# Patient Record
Sex: MALE | Race: WHITE | NOT HISPANIC OR LATINO | Employment: OTHER | URBAN - METROPOLITAN AREA
[De-identification: names, ages, dates, MRNs, and addresses within clinical notes are randomized per-mention and may not be internally consistent; named-entity substitution may affect disease eponyms.]

---

## 2021-09-21 NOTE — PROGRESS NOTES
Subjective   Patient ID: Lucho Carranza is a 71 y.o. male is being seen for consultation today at the request of SANFORD Nelson     He is being seen for Degenerative disc disease and spinal stenosis.    9/15/20 MRI C spine [facility]  11/16/20 XR C spine [facility]  12/3/20 CT C spine [facility]    Today patient reports neck pain that is located in the back and radiates on the left side down his arm rarely.    This very nice gentleman is here for my opinion.  He is already seen Dr. Fair.  Has had chronic neck pain for about 2 years.  It is mainly in the left side of the neck a bit worse with extension.  He has begun experiencing crunching sensation when he turns his neck to the left of the right and hears some grinding noises as well.  Once out in a while he gets a little left arm numbness but no significant pain and no weakness.  He has had 2 cervical ablations before.  Dr. Fair saw him and put him into physical therapy which helped very modestly and he has had 2 epidural blocks by Dr. Russo.  They've helped modestly.  He takes baclofen and Advil.  The pain flared up quite a bit and April of this year.  He was in [place] and had an ablation there which only helped very briefly.  He had 1 before which helped only briefly.  We talked about his cervical MRI which again provides some mechanical basis for his continued pain probably the facet disease which seems to be worse at C5-C6 on the left corresponding to the laterality of his symptoms.  I stressed that there really isn't anything from a surgical standpoint to do.  If on the other hand he develops more radiating arm pain right or left or both then that might change things but he would certainly need to be reimaged.  As far as what to do to his chronic neck pain again I stressed that it is mainly geared towards trying to reduce the severity of the symptoms.  Another ablation could be tried by his current pain doctor.  Dry needling was never tried in physical therapy  and and I thought it is reasonable to give it a try so we'll send him there and I suggested the use of the medicines that he is already on.  Obviously there is not a lot to do that hasn't been already done.  I'll refer him for dry needling as that might help a bit.  But will otherwise keep it open-ended.  If he has a episode of severe left arm pain or right arm pain about he is to let us know because he should be reevaluated probably reimaged.        Neck Pain   The pain is present in the midline and left side. The quality of the pain is described as aching. The pain is at a severity of 3/10. The symptoms are aggravated by twisting. The pain is worse during the day. Pertinent negatives include no fever, headaches, numbness, pain with swallowing or tingling. He has tried muscle relaxants, home exercises and ice (ablations. epidurals, physical therapy) for the symptoms. The treatment provided mild relief.       The following portions of the patient's history were reviewed and updated as appropriate: allergies, current medications, past family history, past medical history, past social history, past surgical history and problem list.    Review of Systems   Constitutional: Negative for chills and fever.   HENT: Negative for congestion.    Eyes: Negative for visual disturbance.   Respiratory: Negative for shortness of breath.    Cardiovascular: Negative for palpitations.   Gastrointestinal: Negative for nausea.   Endocrine: Negative for cold intolerance.   Genitourinary: Negative for urgency.   Musculoskeletal: Positive for neck pain.   Skin: Negative for rash.   Allergic/Immunologic: Negative for environmental allergies.   Neurological: Negative for tingling, numbness and headaches.   Hematological: Negative for adenopathy.   Psychiatric/Behavioral: Negative for sleep disturbance.   All other systems reviewed and are negative.          Objective     Vitals:    09/22/21 0918   BP: 110/68   Temp: 97.7 °F (36.5 °C)  "  Weight: 79.9 kg (176 lb 3.2 oz)   Height: 175.3 cm (69\")     Body mass index is 26.02 kg/m².      Physical Exam  Constitutional:       Appearance: He is well-developed.   HENT:      Head: Normocephalic and atraumatic.   Eyes:      Extraocular Movements: EOM normal.      Conjunctiva/sclera: Conjunctivae normal.      Pupils: Pupils are equal, round, and reactive to light.   Neck:      Vascular: No carotid bruit.   Neurological:      Mental Status: He is oriented to person, place, and time.      Coordination: Finger-Nose-Finger Test and Heel to Shin Test normal.      Gait: Gait is intact.      Deep Tendon Reflexes:      Reflex Scores:       Tricep reflexes are 2+ on the right side and 2+ on the left side.       Bicep reflexes are 2+ on the right side and 2+ on the left side.       Brachioradialis reflexes are 2+ on the right side and 2+ on the left side.       Patellar reflexes are 2+ on the right side and 2+ on the left side.       Achilles reflexes are 2+ on the right side and 2+ on the left side.  Psychiatric:         Speech: Speech normal.       Neurologic Exam     Mental Status   Oriented to person, place, and time.   Registration of memory: Good recent and remote memory.   Attention: normal. Concentration: normal.   Speech: speech is normal   Level of consciousness: alert  Knowledge: consistent with education.     Cranial Nerves     CN II   Visual fields full to confrontation.   Visual acuity: normal    CN III, IV, VI   Pupils are equal, round, and reactive to light.  Extraocular motions are normal.     CN V   Facial sensation intact.   Right corneal reflex: normal  Left corneal reflex: normal    CN VII   Facial expression full, symmetric.   Right facial weakness: none  Left facial weakness: none    CN VIII   Hearing: intact    CN IX, X   Palate: symmetric    CN XI   Right sternocleidomastoid strength: normal  Left sternocleidomastoid strength: normal    CN XII   Tongue: not atrophic  Tongue deviation: " none    Motor Exam   Muscle bulk: normal  Right arm tone: normal  Left arm tone: normal  Right leg tone: normal  Left leg tone: normal    Strength   Strength 5/5 except as noted.     Sensory Exam   Light touch normal.     Gait, Coordination, and Reflexes     Gait  Gait: normal    Coordination   Finger to nose coordination: normal  Heel to shin coordination: normal    Reflexes   Right brachioradialis: 2+  Left brachioradialis: 2+  Right biceps: 2+  Left biceps: 2+  Right triceps: 2+  Left triceps: 2+  Right patellar: 2+  Left patellar: 2+  Right achilles: 2+  Left achilles: 2+  Right : 2+  Left : 2+          Assessment/Plan   Independent Review of Radiographic Studies:      I personally reviewed the images from the following studies.    I reviewed the cervical MRI done on 9/15/2020 which did show multiple levels of disc degeneration and facet disease particular on the left at C5-C6.  A right C3-C4 lateral recess stenosis.  Agree with the report.        Medical Decision Making:      Again, nothing surgical.  We'll refer him to physical therapy to try some dry needling only.  He'll continue to work with Dr. Russo.  I will make it is unreasonable for another ablation to be done at that pain clinic.  There is nothing further that I would add right now so we can keep it open-ended.  If he develops arm symptoms on the right or the left or both he can certainly contact me and he would probably need to be reimaged and reevaluated.      Diagnoses and all orders for this visit:    1. Degeneration of intervertebral disc at C5-C6 level (Primary)  -     Ambulatory Referral to Physical Therapy Evaluate and treat    2. Chronic neck pain  -     Ambulatory Referral to Physical Therapy Evaluate and treat      Return if symptoms worsen or fail to improve.

## 2021-09-22 ENCOUNTER — OFFICE VISIT (OUTPATIENT)
Dept: NEUROSURGERY | Facility: CLINIC | Age: 71
End: 2021-09-22

## 2021-09-22 VITALS
SYSTOLIC BLOOD PRESSURE: 110 MMHG | DIASTOLIC BLOOD PRESSURE: 68 MMHG | BODY MASS INDEX: 26.1 KG/M2 | HEIGHT: 69 IN | TEMPERATURE: 97.7 F | WEIGHT: 176.2 LBS

## 2021-09-22 DIAGNOSIS — G89.29 CHRONIC NECK PAIN: ICD-10-CM

## 2021-09-22 DIAGNOSIS — M50.322 DEGENERATION OF INTERVERTEBRAL DISC AT C5-C6 LEVEL: Primary | ICD-10-CM

## 2021-09-22 DIAGNOSIS — M54.2 CHRONIC NECK PAIN: ICD-10-CM

## 2021-09-22 PROCEDURE — 99204 OFFICE O/P NEW MOD 45 MIN: CPT | Performed by: NEUROLOGICAL SURGERY

## 2021-09-22 RX ORDER — BACLOFEN 10 MG/1
10 TABLET ORAL 2 TIMES DAILY
COMMUNITY
Start: 2021-08-14

## 2021-09-24 ENCOUNTER — HOSPITAL ENCOUNTER (OUTPATIENT)
Dept: PHYSICAL THERAPY | Facility: HOSPITAL | Age: 71
Setting detail: THERAPIES SERIES
Discharge: HOME | End: 2021-09-24

## 2021-09-24 DIAGNOSIS — M62.838 MUSCLE SPASM: ICD-10-CM

## 2021-09-24 DIAGNOSIS — M50.322 DEGENERATION OF C5-C6 INTERVERTEBRAL DISC: Primary | ICD-10-CM

## 2021-09-24 DIAGNOSIS — M54.2 CHRONIC NECK PAIN: ICD-10-CM

## 2021-09-24 DIAGNOSIS — G89.29 CHRONIC NECK PAIN: ICD-10-CM

## 2021-09-24 PROCEDURE — 97161 PT EVAL LOW COMPLEX 20 MIN: CPT

## 2021-09-24 NOTE — THERAPY EVALUATION
Outpatient Physical Therapy Ortho Initial Evaluation  [facility]     Patient Name: Lucho Carranza  : 1950  MRN: [id number]  Today's Date: 2021      Visit Date: 2021    Patient Active Problem List   Diagnosis   • Degeneration of intervertebral disc at C5-C6 level   • Chronic neck pain        Past Medical History:   Diagnosis Date   • Arthritis         Past Surgical History:   Procedure Laterality Date   • HERNIA REPAIR     • ROTATOR CUFF REPAIR Left        Visit Dx:     ICD-10-CM ICD-9-CM   1. Degeneration of C5-C6 intervertebral disc  M50.322 722.4   2. Chronic neck pain  M54.2 723.1    G89.29 338.29   3. Muscle spasm  M62.838 728.85         Patient History     Row Name 21 1300             History    Chief Complaint  Pain  -KA      Type of Pain  Neck pain  -KA      Date Current Problem(s) Began  19  -KA      Brief Description of Current Complaint  Pt began having pain in his neck a few years ago, noticed hearing noise when he turned his head at work.  Has limited motion left and right, pain has gotten progressively worse over time.  He has a prescription for Baclofen, but has not taken any in the past few days.  Pt reports that he has had 2 epidurals, the most recent last week.  Feels like the epidural really helped. Pain is worst in the morning.  -KA      Previous treatment for THIS PROBLEM  Injections;Rehabilitation  -KA      Patient/Caregiver Goals  Relieve pain;Improve mobility;Return to prior level of function  -KA         Pain     Pain Location  Neck  -KA      Pain at Present  0  -KA      Pain at Best  0  -KA      Pain at Worst  6  -KA      Pain Frequency  Intermittent  -KA      Pain Description  Aching  -KA      What Performance Factors Make the Current Problem(s) WORSE?  Turning head, prolonged positions  -KA      What Performance Factors Make the Current Problem(s) BETTER?  Changing positions  -KA      Is your sleep disturbed?  No  -KA      Difficulties with ADL's?   Driving  -KA         Fall Risk Assessment    Any falls in the past year:  No  -KA         Services    Are you currently receiving Home Health services  No  -KA         Daily Activities    Primary Language  English  -KA      How does patient learn best?  Listening;Reading;Demonstration  -KA      Teaching needs identified  Management of Condition;Other (comment) Dry Needling  -KA      Patient is concerned about/has problems with  Other (comment) Turning head  -KA      Does patient have problems with the following?  None  -KA      Barriers to learning  None  -KA      Pt Participated in POC and Goals  Yes  -KA         Safety    Are you being hurt, hit, or frightened by anyone at home or in your life?  No  -KA      Are you being neglected by a caregiver  No  -KA      Have you had any of the following issues with  N/A  -KA        User Key  (r) = Recorded By, (t) = Taken By, (c) = Cosigned By    Initials Name Provider Type    Maile Orona, PT Physical Therapist          PT Ortho     Row Name 09/24/21 1400       Subjective Comments    Subjective Comments  Pt reports that he has pain in left>right cervical musculature, worst when turning head or maintaining prolonged positions. Interested in dry needling.  -KA       Subjective Pain    Able to rate subjective pain?  yes  -KA    Pre-Treatment Pain Level  0  -KA    Post-Treatment Pain Level  0  -KA    Subjective Pain Comment  No pain at rest.  -KA       Posture/Observations    Alignment Options  Forward head;Rounded shoulders  -KA    Forward Head  Mild  -KA    Rounded Shoulders  Mild  -KA       General ROM    Head/Neck/Trunk  Neck Extension;Neck Flexion;Neck Lt Rotation;Neck Rt Rotation  -KA    GENERAL ROM COMMENTS  BUE/BLE ROM WFLs  -KA       Head/Neck/Trunk    Neck Extension AROM  Limited to 20' by pain  -KA    Neck Flexion AROM  WFLs  -KA    Neck Lt Rotation AROM  35'  -KA    Neck Rt Rotation AROM  40'  -KA       MMT (Manual Muscle Testing)    Rt Upper Ext  Rt  Shoulder WNL;Rt Scapula WFL;Rt Elbow/Forearm WFL  -KA    Lt Upper Ext  Lt Shoulder WNL;Lt Scapula WFL;Lt Elbow/Forearm WFL  -KA       Sensation    Sensation WNL?  WNL  -KA    Light Touch  No apparent deficits  -KA    Sharp/Dull  No apparent deficits  -KA      User Key  (r) = Recorded By, (t) = Taken By, (c) = Cosigned By    Initials Name Provider Type    Maile Orona, PT Physical Therapist                      Therapy Education  Education Details: Discussed dry needling intervention with patient including benefits, side effects, and technique.  Given: Symptoms/condition management  Program: New  How Provided: Verbal  Provided to: Patient  Level of Understanding: Verbalized     PT OP Goals     Row Name 09/24/21 1400          PT Short Term Goals    STG Date to Achieve  10/15/21  -KA     STG 1  Pt will report pain rated 3/10 at worst when driving in order to demonstrate ability to return to normalized ADLs and functional activities.  -KA        Long Term Goals    LTG 1  Pt will reduce level of perceived disability as measured by the Neck Disability Index to 5% or less in order to improve QOL.  -KA        Time Calculation    PT Goal Re-Cert Due Date  12/23/21  -KA       User Key  (r) = Recorded By, (t) = Taken By, (c) = Cosigned By    Initials Name Provider Type    Maile Orona, PT Physical Therapist          PT Assessment/Plan     Row Name 09/24/21 1506 09/24/21 1450       PT Assessment    Functional Limitations  Limitations in functional capacity and performance  -KA  --    Impairments  Pain;Range of motion  -KA  --    Assessment Comments  --  Pt is a 71 year old male referred to outpatient physical therapy for evaluation and treatment of chronic neck pain.  Patient presents with decreased postural awareness, decreased cervical ROM, and muscle spasm/tightness of L upper trapezius and levator scapulae. Pt's signs and symptoms are consistent with cervical disc degeneration and chronic neck pain.   Pertinent comorbidities and personal factors that may affect progress include, but are not limited to going out of town for trip during month of October and having surgical procedure performed on bladder next week.  Pt scored 14% on the Neck Disability Index where 0% is no disability and is in stable clinical condition. Pt would benefit from skilled PT including dry needling to address functional deficits and return to PLOF.  -    Rehab Potential  --  Excellent  -    Patient/caregiver participated in establishment of treatment plan and goals  --  Yes  -    Patient would benefit from skilled therapy intervention  --  Yes  -KA       PT Plan    PT Frequency  --  2x/week  -    Predicted Duration of Therapy Intervention (PT)  --  12 weeks, estimated 4-6 total visits  -    Planned CPT's?  --  PT EVAL LOW COMPLEXITY: 09475;PT THER PROC EA 15 MIN: 34874;PT THER ACT EA 15 MIN: 26128;PT MANUAL THERAPY EA 15 MIN: 44219;PT NEUROMUSC RE-EDUCATION EA 15 MIN: 55501  -    Physical Therapy Interventions (Optional Details)  --  dry needling;home exercise program  -    PT Plan Comments  --  Proceed with dry needling intervention, restrictions worst in L UT musculature.  -    Row Name 09/24/21 1447          PT Assessment    Functional Limitations  --  -     Assessment Comments  --  -       User Key  (r) = Recorded By, (t) = Taken By, (c) = Cosigned By    Initials Name Provider Type    Maile Orona, PT Physical Therapist            OP Exercises     Row Name 09/24/21 1400             Subjective Comments    Subjective Comments  Pt reports that he has pain in left>right cervical musculature, worst when turning head or maintaining prolonged positions. Interested in dry needling.  -ALFREDO         Subjective Pain    Able to rate subjective pain?  yes  -ALFREDO      Pre-Treatment Pain Level  0  -KA      Post-Treatment Pain Level  0  -      Subjective Pain Comment  No pain at rest.  -        User Key  (r) = Recorded By,  (t) = Taken By, (c) = Cosigned By    Initials Name Provider Type    KA Maile Sexton, PT Physical Therapist                        Outcome Measure Options: Neck Disability Index (NDI)  Neck Disability Index  Section 1 - Pain Intensity: The pain is very mild at the moment.  Section 2 - Personal Care: I can look after myself normally without causing extra pain.  Section 3 - Lifting: I can lift heavy weights, but it gives me extra pain.  Section 4 - Work: I can only do my usual work, but no more  Section 5 - Headaches: I have no headaches at all.  Section 6 - Concentration: I can concentrate fully without difficulty.  Section 7 - Sleeping: My sleep is slightly disturbed for less than 1 hour.  Section 8 - Driving: I can drive as long as I want with moderate neck pain.  Section 9 - Reading: I can read as much as I want with no neck pain.  Section 10 - Recreation: I have some neck pain with all recreational activities.  Neck Disability Index Score: 7      Time Calculation:     Start Time: 0123  Stop Time: 0155  Time Calculation (min): 32 min  Untimed Charges  PT Eval/Re-eval Minutes: 32  Total Minutes  Untimed Charges Total Minutes: 32   Total Minutes: 32     Therapy Charges for Today     Code Description Service Date Service Provider Modifiers Qty    39265534129 HC PT EVAL LOW COMPLEXITY 2 9/24/2021 Maile Sexton, PT GP 1          PT G-Codes  Outcome Measure Options: Neck Disability Index (NDI)  Neck Disability Index Score: 7         Maile Sexton PT  9/24/2021

## 2021-09-24 NOTE — THERAPY TREATMENT NOTE
Outpatient Physical Therapy Ortho Treatment Note  [facility]     Patient Name: Lucho Carranza  : 1950  MRN: [id number]  Today's Date: 2021      Visit Date: 2021    Visit Dx:    ICD-10-CM ICD-9-CM   1. Degeneration of C5-C6 intervertebral disc  M50.322 722.4   2. Chronic neck pain  M54.2 723.1    G89.29 338.29   3. Muscle spasm  M62.838 728.85       Patient Active Problem List   Diagnosis   • Degeneration of intervertebral disc at C5-C6 level   • Chronic neck pain        Past Medical History:   Diagnosis Date   • Arthritis         Past Surgical History:   Procedure Laterality Date   • HERNIA REPAIR     • ROTATOR CUFF REPAIR Left        PT Ortho     Row Name 21 1500       Subjective Comments    Subjective Comments  Pt reports no pain upon arrival, but has tightness and difficulty turning head to left >right.  -ALFREDO      User Key  (r) = Recorded By, (t) = Taken By, (c) = Cosigned By    Initials Name Provider Type    Maile Orona, PT Physical Therapist                      PT Assessment/Plan     Row Name 21 1514          PT Assessment    Assessment Comments  Pt provided written consent and responded well to dry needling intervention to L upper trapezius.  3 30 mm needles used to target 3 separate trigger points in the L UT muscle.  Pt was seated in chair with PT grasping L UT away from body for the entirety of the treatment.  Winding and coning techniques utilized.  Pt with no adverse effects.  -ALFREDO       User Key  (r) = Recorded By, (t) = Taken By, (c) = Cosigned By    Initials Name Provider Type    Maile Orona, PT Physical Therapist          Modalities     Row Name 21 1500             Other Treatment Provided    Rx Minutes  15 mins  -ALFREDO        User Key  (r) = Recorded By, (t) = Taken By, (c) = Cosigned By    Initials Name Provider Type    Maile Orona, JAY Physical Therapist        OP Exercises     Row Name 21 1500             Subjective Comments     Subjective Comments  Pt reports no pain upon arrival, but has tightness and difficulty turning head to left >right.  -KA        User Key  (r) = Recorded By, (t) = Taken By, (c) = Cosigned By    Initials Name Provider Type    Maile Orona PT Physical Therapist                       PT OP Goals     Row Name 09/24/21 1500          PT Short Term Goals    STG Date to Achieve  10/15/21  -KA     STG 1  Pt will report pain rated 3/10 at worst when driving in order to demonstrate ability to return to normalized ADLs and functional activities.  -KA     STG 1 Progress  New  -KA        Long Term Goals    LTG Date to Achieve  11/05/21  -KA     LTG 1  Pt will reduce level of perceived disability as measured by the Neck Disability Index to 5% or less in order to improve QOL.  -KA     LTG 1 Progress  New  -KA       User Key  (r) = Recorded By, (t) = Taken By, (c) = Cosigned By    Initials Name Provider Type    Maile Orona PT Physical Therapist          Therapy Education  Education Details: Discussed dry needling intervention with patient including benefits, side effects, and technique.  Given: Symptoms/condition management  Program: New  How Provided: Verbal  Provided to: Patient  Level of Understanding: Verbalized              Time Calculation:   Start Time: 1400  Stop Time: 1415  Time Calculation (min): 15 min  Therapy Charges for Today     Code Description Service Date Service Provider Modifiers Qty    [id number]  PT SELF PAY DRY NEEDLING SESSION 9/24/2021 Maile Sexton, PT  1                    Maile Sexton PT  9/24/2021

## 2021-10-08 ENCOUNTER — HOSPITAL ENCOUNTER (OUTPATIENT)
Dept: PHYSICAL THERAPY | Facility: HOSPITAL | Age: 71
Setting detail: THERAPIES SERIES
Discharge: HOME | End: 2021-10-08

## 2021-10-08 DIAGNOSIS — G89.29 CHRONIC NECK PAIN: ICD-10-CM

## 2021-10-08 DIAGNOSIS — M62.838 MUSCLE SPASM: ICD-10-CM

## 2021-10-08 DIAGNOSIS — M54.2 CHRONIC NECK PAIN: ICD-10-CM

## 2021-10-08 DIAGNOSIS — M50.322 DEGENERATION OF C5-C6 INTERVERTEBRAL DISC: Primary | ICD-10-CM

## 2021-10-08 NOTE — THERAPY TREATMENT NOTE
Outpatient Physical Therapy Ortho Treatment Note  [facility]     Patient Name: Lucho Carranza  : 1950  MRN: [id number]  Today's Date: 10/8/2021      Visit Date: 10/08/2021    Visit Dx:    ICD-10-CM ICD-9-CM   1. Degeneration of C5-C6 intervertebral disc  M50.322 722.4   2. Chronic neck pain  M54.2 723.1    G89.29 338.29   3. Muscle spasm  M62.838 728.85       Patient Active Problem List   Diagnosis   • Degeneration of intervertebral disc at C5-C6 level   • Chronic neck pain        Past Medical History:   Diagnosis Date   • Arthritis         Past Surgical History:   Procedure Laterality Date   • HERNIA REPAIR     • ROTATOR CUFF REPAIR Left                        PT Assessment/Plan     Row Name 10/08/21 1600          PT Assessment    Assessment Comments  Pt reports somewhat positive response to last session, therefore would like to try needling again. Informed consent obtained. 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 Pt positioned in prone throughout treatment, focused on L upper trap muscle. Pt reports soreness and small LTRs noted, one small bruise also noted. Otherwise, tolerance for treament was good and no adverse effects were noted. Pt leaves for a month long trip and reports will call for another appointment as needed.  -RS        PT Plan    PT Plan Comments  Pt to call as needed  -RS       User Key  (r) = Recorded By, (t) = Taken By, (c) = Cosigned By    Initials Name Provider Type    Chrissy Jc PT Physical Therapist            OP Exercises     Row Name 10/08/21 1600 10/08/21 1500          Subjective Comments    Subjective Comments  pt feels needling helped some and would like to have it done again this date  -RS  --        Total Minutes    92423 - PT Manual Therapy Minutes  --  19  -RS       User Key  (r) = Recorded By, (t) = Taken By, (c) = Cosigned By    Initials Name Provider Type    Chrissy Jc PT Physical Therapist                       Manual Rx (last 36 hours)      Manual Treatments     Row Name 10/08/21 1500             Total Minutes    43034 - PT Manual Therapy Minutes  19  -RS         Manual Rx 1    Manual Rx 1 Location  intramuscular manual therapy  Assessed pt. for Dry Needling and intramuscular manual therapy. Discussed risks/benefits of Dry Needling with pt, including but not limited to muscle soreness, bruising, vasovagal response, pneumothorax, nerve injury. Patient signed written consent to proceed with treatment.     Patient position during treatment: prone with LE supported on pillow and neck in neutral      Muscles treated:     L UT- pincer grasp noted, multiple, winding and coning performed.  30mm and No  50 mm needles used. Therapists palpation hand grasping muscle tissue away from body throughout treatment.       Response to treatment: small  LTRs L UT, pt reports soreness, small bruise noted mid UT     Small area of bruising noted L UT, no bleeding noted.    No immediate adverse responses observed in clinic.    Following treatment, pt transitioned to sitting, no reports of dizziness or lightheadness noted. No immediate adverse effects noted, reviewed pain management techniques with patient at end of session and advised to perform light stretches later today and to maintain consistency with HEP until next PT session.      Palpation used before, during, and after to facilitate assessment Clean needle technique observed at all times, precautions for lung fields, neurovascular structures observed.     -RS      Manual Rx 1 Type  pt prone, LUT  -RS        User Key  (r) = Recorded By, (t) = Taken By, (c) = Cosigned By    Initials Name Provider Type    RS Chrissy Roe, PT Physical Therapist          PT OP Goals     Row Name 10/08/21 1500          PT Short Term Goals    STG Date to Achieve  10/15/21  -RS     STG 1  Pt will report pain rated 3/10 at worst when driving in order to demonstrate ability to return to  normalized ADLs and functional activities.  -RS     STG 1 Progress  Ongoing  -RS        Long Term Goals    LTG Date to Achieve  11/05/21  -RS     LTG 1  Pt will reduce level of perceived disability as measured by the Neck Disability Index to 5% or less in order to improve QOL.  -RS     LTG 1 Progress  Ongoing  -RS       User Key  (r) = Recorded By, (t) = Taken By, (c) = Cosigned By    Initials Name Provider Type    RS Chrissy Roe, PT Physical Therapist                         Time Calculation:   Start Time: 1550  Stop Time: 1611  Time Calculation (min): 21 min  Timed Charges  83844 - PT Manual Therapy Minutes: 19  Total Minutes  Timed Charges Total Minutes: 19   Total Minutes: 19  Therapy Charges for Today     Code Description Service Date Service Provider Modifiers Qty    [id number]  PT SELF PAY DRY NEEDLING SESSION 10/8/2021 Chrissy Roe, PT  1                    Chrissy Roe PT  10/8/2021

## 2022-08-19 ENCOUNTER — DOCUMENTATION (OUTPATIENT)
Dept: PHYSICAL THERAPY | Facility: HOSPITAL | Age: 72
End: 2022-08-19

## 2022-08-19 DIAGNOSIS — M50.322 DEGENERATION OF C5-C6 INTERVERTEBRAL DISC: Primary | ICD-10-CM

## 2022-08-19 DIAGNOSIS — M54.2 CHRONIC NECK PAIN: ICD-10-CM

## 2022-08-19 DIAGNOSIS — G89.29 CHRONIC NECK PAIN: ICD-10-CM

## 2022-08-19 DIAGNOSIS — M62.838 MUSCLE SPASM: ICD-10-CM

## 2022-08-19 NOTE — THERAPY DISCHARGE NOTE
Outpatient Physical Therapy Discharge Summary         Patient Name: Lucho Carranza  : 1950  MRN: [id number]    Today's Date: 2022    Visit Dx:    ICD-10-CM ICD-9-CM   1. Degeneration of C5-C6 intervertebral disc  M50.322 722.4   2. Chronic neck pain  M54.2 723.1    G89.29 338.29   3. Muscle spasm  M62.838 728.85        PT OP Goals     Row Name 22 1100          PT Short Term Goals    STG Date to Achieve 10/15/21  -RS     STG 1 Pt will report pain rated 3/10 at worst when driving in order to demonstrate ability to return to normalized ADLs and functional activities.  -RS     STG 1 Progress Not Met  -RS            Long Term Goals    LTG Date to Achieve 21  -RS     LTG 1 Pt will reduce level of perceived disability as measured by the Neck Disability Index to 5% or less in order to improve QOL.  -RS     LTG 1 Progress Not Met  -RS           User Key  (r) = Recorded By, (t) = Taken By, (c) = Cosigned By    Initials Name Provider Type    Chrissy Jc, PT Physical Therapist                OP PT Discharge Summary  Date of Discharge: 22  Reason for Discharge: other (comment)  Outcomes Achieved: Refer to plan of care for updates on goals achieved  Discharge Destination: Home with home program  Discharge Instructions/Additional Comments: Pt was seen for 2 treatments including dry needling, unable to formally assess progress toward functional goals. He did not return to PT therefore is DC this date.      Time Calculation:                    Chrissy Roe PT  2022

## 2023-06-09 ENCOUNTER — TRANSCRIBE ORDERS (OUTPATIENT)
Dept: ADMINISTRATIVE | Facility: HOSPITAL | Age: 73
End: 2023-06-09
Payer: MEDICARE

## 2023-06-09 DIAGNOSIS — R97.20 ELEVATED PSA: Primary | ICD-10-CM
